# Patient Record
Sex: MALE | Race: WHITE | NOT HISPANIC OR LATINO
[De-identification: names, ages, dates, MRNs, and addresses within clinical notes are randomized per-mention and may not be internally consistent; named-entity substitution may affect disease eponyms.]

---

## 2019-09-20 PROBLEM — Z00.00 ENCOUNTER FOR PREVENTIVE HEALTH EXAMINATION: Status: ACTIVE | Noted: 2019-09-20

## 2019-09-23 ENCOUNTER — FORM ENCOUNTER (OUTPATIENT)
Age: 69
End: 2019-09-23

## 2019-09-24 ENCOUNTER — APPOINTMENT (OUTPATIENT)
Dept: ORTHOPEDIC SURGERY | Facility: CLINIC | Age: 69
End: 2019-09-24
Payer: MEDICARE

## 2019-09-24 ENCOUNTER — OUTPATIENT (OUTPATIENT)
Dept: OUTPATIENT SERVICES | Facility: HOSPITAL | Age: 69
LOS: 1 days | End: 2019-09-24
Payer: MEDICARE

## 2019-09-24 VITALS — BODY MASS INDEX: 31.07 KG/M2 | WEIGHT: 217 LBS | HEIGHT: 70 IN

## 2019-09-24 DIAGNOSIS — Z78.9 OTHER SPECIFIED HEALTH STATUS: ICD-10-CM

## 2019-09-24 DIAGNOSIS — Z60.2 PROBLEMS RELATED TO LIVING ALONE: ICD-10-CM

## 2019-09-24 DIAGNOSIS — S83.242A OTHER TEAR OF MEDIAL MENISCUS, CURRENT INJURY, LEFT KNEE, INITIAL ENCOUNTER: ICD-10-CM

## 2019-09-24 PROCEDURE — 72170 X-RAY EXAM OF PELVIS: CPT

## 2019-09-24 PROCEDURE — 73564 X-RAY EXAM KNEE 4 OR MORE: CPT

## 2019-09-24 PROCEDURE — 73564 X-RAY EXAM KNEE 4 OR MORE: CPT | Mod: 26,50

## 2019-09-24 PROCEDURE — 99203 OFFICE O/P NEW LOW 30 MIN: CPT

## 2019-09-24 PROCEDURE — 72170 X-RAY EXAM OF PELVIS: CPT | Mod: 26

## 2019-09-24 RX ORDER — MELOXICAM 15 MG/1
15 TABLET ORAL
Qty: 30 | Refills: 0 | Status: ACTIVE | COMMUNITY
Start: 2019-09-24 | End: 1900-01-01

## 2019-09-24 SDOH — SOCIAL STABILITY - SOCIAL INSECURITY: PROBLEMS RELATED TO LIVING ALONE: Z60.2

## 2019-10-13 PROBLEM — Z78.9 NON-SMOKER: Status: ACTIVE | Noted: 2019-10-13

## 2019-10-13 PROBLEM — Z60.2 LIVES ALONE: Status: ACTIVE | Noted: 2019-10-13

## 2019-10-13 NOTE — END OF VISIT
[FreeTextEntry3] : All medical record entries made by Gustavo Mayorga acting as a scribe for the performing provider (Luciano Peralta MD and/or PROSPER Biggs) on 09/24/2019. All entries were dictated to me by the performing medical provider. In signing this record, the medical provider affirms that they have personally performed the history, physical exam, assessment and plan and have also directed, reviewed and agreed to the documentation in the chart.

## 2019-10-13 NOTE — HISTORY OF PRESENT ILLNESS
[___ mths] : [unfilled] month(s) ago [Standing] : standing [Constant] : ~He/She~ states the symptoms seem to be constant [Bending] : worsened by bending [Sitting] : worsened by sitting [Walking] : worsened by walking [None] : No relieving factors are noted [de-identified] : 69 year old M presents today for initial evaluation of left knee pain that began 6 months ago. He reports moderate, occasional left knee pain with walking, biking and stairs as well as mild left knee stiffness. Patient can walk less than 5 blocks with a slight limp and uses a rail to ascend and descend stairs. Ibuprofen helps a little to relieve pain.\par Of note, patient is s/p partial meniscectomy on the right knee about 20 years ago and reports that he is satisfied with this surgery.\par \par

## 2019-10-13 NOTE — PHYSICAL EXAM
[de-identified] : Constitutional: Well appearing. No acute distress.\par Mental Status: Alert & oriented to person, place and time. Normal affect.\par Pulmonary: No respiratory distress. Normal chest excursion.\par \par Gait: Normal.\par Ambulatory assist devices: None.\par \par Cervical spine: Skin intact. No visible deformity. Painless active ROM without evident restriction.\par Bilateral upper extremities: Skin intact. No deformity. Painless active ROM without evident restriction.\par Thoracolumbar spine: No deformity. No tenderness. No radicular pain on passive straight leg raise bilaterally.\par \par Pelvis: No pelvic obliquity. No tenderness.\par Leg lengths: Equal.\par \par Bilateral Hips: No swelling or deformity. Painless and unrestricted range of motion. No crepitation. \par \par Right Knee:\par Skin intact. No surgical scars. No erythema or ecchymosis. No swelling or effusion. No deformity. No focal tenderness.\par Painless ROM from full extension to 135 degrees of flexion.\par Central patellar tracking. No crepitation. No instability.\par \par Left Knee:\par Skin intact. No surgical scars. No erythema or ecchymosis. No swelling or effusion. No deformity. No focal tenderness.\par Painful ROM from full extension to 120 degrees of flexion. (+) Medial knee pain on terminal knee flexion.\par Central patellar tracking. No crepitation. No instability.\par \par Neurological: Intact distal crude touch sensation. Normal distal motor power. \par Cardiovascular: Palpable dorsalis pedis and posterior tibialis pulses. Brisk capillary refill. No peripheral edema.\par Lymphatics: No peripheral adenopathy appreciated. [de-identified] : X-ray imaging of the left knee done here today demonstrates mild left knee arthritis.\par \par MRI imaging of the left knee done on 8/16/19:\par Impression:\par -Tear of the body and posterior horn of the medial meniscus with a moderate joint effusion and fluid in the popliteus tendon sheath. Minimal subchondral marrow edema involving the medial tibial plateau. No tendon or ligamentous tear.

## 2019-10-13 NOTE — DISCUSSION/SUMMARY
[de-identified] : Mr. Whyte presents with left knee pain secondary to meniscal tearing. I informed patient that he has mild DJD in the left knee and because of this, I do not currently recommend an arthroscopic operation at this time because I cannot guarantee that it would make his symptoms markedly better for a long period of time. I told patient that he may need knee replacement surgery in the future but noted that he has more arthritis in his right knee than in his left. \par We discussed the diagnosis and prognosis of the patient's condition. Non surgical treatment options include physical therapy and low impact exercise, weight loss for those who are overweight, NSAID and analgesic use, joint injections and activity modification including the use of assistive devices. I advised patient to find low impact exercise that helps him regain strength and coordination without aggravating the knee. I prescribed Meloxicam but advised patient not to take it concurrently with other antiinflammatories. I informed patient that if his pain doesn't improve within the next few months, we can discuss the possibility of an arthroscopic operation.\par Follow up if symptoms don't subside in the next few months, otherwise, follow up PRN.

## 2021-10-02 ENCOUNTER — INPATIENT (INPATIENT)
Facility: HOSPITAL | Age: 71
LOS: 1 days | Discharge: HOME | DRG: 312 | End: 2021-10-04
Attending: STUDENT IN AN ORGANIZED HEALTH CARE EDUCATION/TRAINING PROGRAM | Admitting: STUDENT IN AN ORGANIZED HEALTH CARE EDUCATION/TRAINING PROGRAM
Payer: MEDICARE

## 2021-10-02 VITALS
SYSTOLIC BLOOD PRESSURE: 143 MMHG | TEMPERATURE: 98 F | WEIGHT: 219.58 LBS | OXYGEN SATURATION: 96 % | DIASTOLIC BLOOD PRESSURE: 91 MMHG | RESPIRATION RATE: 17 BRPM | HEART RATE: 73 BPM

## 2021-10-02 DIAGNOSIS — N17.9 ACUTE KIDNEY FAILURE, UNSPECIFIED: ICD-10-CM

## 2021-10-02 DIAGNOSIS — Z87.798 PERSONAL HISTORY OF OTHER (CORRECTED) CONGENITAL MALFORMATIONS: Chronic | ICD-10-CM

## 2021-10-02 DIAGNOSIS — R42 DIZZINESS AND GIDDINESS: ICD-10-CM

## 2021-10-02 DIAGNOSIS — R94.31 ABNORMAL ELECTROCARDIOGRAM [ECG] [EKG]: ICD-10-CM

## 2021-10-02 DIAGNOSIS — Z29.9 ENCOUNTER FOR PROPHYLACTIC MEASURES, UNSPECIFIED: ICD-10-CM

## 2021-10-02 LAB
ALBUMIN SERPL ELPH-MCNC: 4.1 G/DL — SIGNIFICANT CHANGE UP (ref 3.3–5)
ALP SERPL-CCNC: 106 U/L — SIGNIFICANT CHANGE UP (ref 40–120)
ALT FLD-CCNC: 39 U/L — SIGNIFICANT CHANGE UP (ref 10–45)
ANION GAP SERPL CALC-SCNC: 10 MMOL/L — SIGNIFICANT CHANGE UP (ref 5–17)
APTT BLD: 33.3 SEC — SIGNIFICANT CHANGE UP (ref 27.5–35.5)
AST SERPL-CCNC: 33 U/L — SIGNIFICANT CHANGE UP (ref 10–40)
BASOPHILS # BLD AUTO: 0.06 K/UL — SIGNIFICANT CHANGE UP (ref 0–0.2)
BASOPHILS NFR BLD AUTO: 0.9 % — SIGNIFICANT CHANGE UP (ref 0–2)
BILIRUB SERPL-MCNC: 0.8 MG/DL — SIGNIFICANT CHANGE UP (ref 0.2–1.2)
BUN SERPL-MCNC: 24 MG/DL — HIGH (ref 7–23)
CALCIUM SERPL-MCNC: 9.8 MG/DL — SIGNIFICANT CHANGE UP (ref 8.4–10.5)
CHLORIDE SERPL-SCNC: 105 MMOL/L — SIGNIFICANT CHANGE UP (ref 96–108)
CO2 SERPL-SCNC: 29 MMOL/L — SIGNIFICANT CHANGE UP (ref 22–31)
CREAT SERPL-MCNC: 1.26 MG/DL — SIGNIFICANT CHANGE UP (ref 0.5–1.3)
EOSINOPHIL # BLD AUTO: 0.2 K/UL — SIGNIFICANT CHANGE UP (ref 0–0.5)
EOSINOPHIL NFR BLD AUTO: 3.1 % — SIGNIFICANT CHANGE UP (ref 0–6)
GLUCOSE SERPL-MCNC: 125 MG/DL — HIGH (ref 70–99)
HCT VFR BLD CALC: 46.9 % — SIGNIFICANT CHANGE UP (ref 39–50)
HGB BLD-MCNC: 16.4 G/DL — SIGNIFICANT CHANGE UP (ref 13–17)
IMM GRANULOCYTES NFR BLD AUTO: 0.3 % — SIGNIFICANT CHANGE UP (ref 0–1.5)
INR BLD: 0.98 RATIO — SIGNIFICANT CHANGE UP (ref 0.88–1.16)
LYMPHOCYTES # BLD AUTO: 1.69 K/UL — SIGNIFICANT CHANGE UP (ref 1–3.3)
LYMPHOCYTES # BLD AUTO: 25.8 % — SIGNIFICANT CHANGE UP (ref 13–44)
MCHC RBC-ENTMCNC: 30.4 PG — SIGNIFICANT CHANGE UP (ref 27–34)
MCHC RBC-ENTMCNC: 35 GM/DL — SIGNIFICANT CHANGE UP (ref 32–36)
MCV RBC AUTO: 86.9 FL — SIGNIFICANT CHANGE UP (ref 80–100)
MONOCYTES # BLD AUTO: 0.47 K/UL — SIGNIFICANT CHANGE UP (ref 0–0.9)
MONOCYTES NFR BLD AUTO: 7.2 % — SIGNIFICANT CHANGE UP (ref 2–14)
NEUTROPHILS # BLD AUTO: 4.11 K/UL — SIGNIFICANT CHANGE UP (ref 1.8–7.4)
NEUTROPHILS NFR BLD AUTO: 62.7 % — SIGNIFICANT CHANGE UP (ref 43–77)
NRBC # BLD: 0 /100 WBCS — SIGNIFICANT CHANGE UP (ref 0–0)
PLATELET # BLD AUTO: 226 K/UL — SIGNIFICANT CHANGE UP (ref 150–400)
POTASSIUM SERPL-MCNC: 4.4 MMOL/L — SIGNIFICANT CHANGE UP (ref 3.5–5.3)
POTASSIUM SERPL-SCNC: 4.4 MMOL/L — SIGNIFICANT CHANGE UP (ref 3.5–5.3)
PROT SERPL-MCNC: 7.8 G/DL — SIGNIFICANT CHANGE UP (ref 6–8.3)
PROTHROM AB SERPL-ACNC: 11.9 SEC — SIGNIFICANT CHANGE UP (ref 10.6–13.6)
RBC # BLD: 5.4 M/UL — SIGNIFICANT CHANGE UP (ref 4.2–5.8)
RBC # FLD: 12 % — SIGNIFICANT CHANGE UP (ref 10.3–14.5)
SARS-COV-2 RNA SPEC QL NAA+PROBE: SIGNIFICANT CHANGE UP
SODIUM SERPL-SCNC: 144 MMOL/L — SIGNIFICANT CHANGE UP (ref 135–145)
TROPONIN I SERPL-MCNC: <.017 NG/ML — LOW (ref 0.02–0.06)
TROPONIN I SERPL-MCNC: <.017 NG/ML — LOW (ref 0.02–0.06)
WBC # BLD: 6.55 K/UL — SIGNIFICANT CHANGE UP (ref 3.8–10.5)
WBC # FLD AUTO: 6.55 K/UL — SIGNIFICANT CHANGE UP (ref 3.8–10.5)

## 2021-10-02 PROCEDURE — 70498 CT ANGIOGRAPHY NECK: CPT | Mod: 26,MA

## 2021-10-02 PROCEDURE — 0042T: CPT

## 2021-10-02 PROCEDURE — 70496 CT ANGIOGRAPHY HEAD: CPT | Mod: 26,MA

## 2021-10-02 PROCEDURE — 70450 CT HEAD/BRAIN W/O DYE: CPT | Mod: 26,59,MA

## 2021-10-02 PROCEDURE — 93010 ELECTROCARDIOGRAM REPORT: CPT

## 2021-10-02 PROCEDURE — 99222 1ST HOSP IP/OBS MODERATE 55: CPT

## 2021-10-02 PROCEDURE — 99285 EMERGENCY DEPT VISIT HI MDM: CPT | Mod: GC

## 2021-10-02 RX ORDER — ONDANSETRON 8 MG/1
4 TABLET, FILM COATED ORAL EVERY 6 HOURS
Refills: 0 | Status: DISCONTINUED | OUTPATIENT
Start: 2021-10-02 | End: 2021-10-04

## 2021-10-02 RX ORDER — MECLIZINE HCL 12.5 MG
25 TABLET ORAL
Refills: 0 | Status: COMPLETED | OUTPATIENT
Start: 2021-10-02 | End: 2021-10-04

## 2021-10-02 RX ORDER — ASPIRIN/CALCIUM CARB/MAGNESIUM 324 MG
324 TABLET ORAL ONCE
Refills: 0 | Status: COMPLETED | OUTPATIENT
Start: 2021-10-02 | End: 2021-10-02

## 2021-10-02 RX ORDER — SODIUM CHLORIDE 9 MG/ML
1000 INJECTION INTRAMUSCULAR; INTRAVENOUS; SUBCUTANEOUS
Refills: 0 | Status: DISCONTINUED | OUTPATIENT
Start: 2021-10-02 | End: 2021-10-03

## 2021-10-02 RX ORDER — MECLIZINE HCL 12.5 MG
25 TABLET ORAL ONCE
Refills: 0 | Status: COMPLETED | OUTPATIENT
Start: 2021-10-02 | End: 2021-10-02

## 2021-10-02 RX ORDER — ONDANSETRON 8 MG/1
4 TABLET, FILM COATED ORAL ONCE
Refills: 0 | Status: COMPLETED | OUTPATIENT
Start: 2021-10-02 | End: 2021-10-02

## 2021-10-02 RX ORDER — ACETAMINOPHEN 500 MG
650 TABLET ORAL EVERY 6 HOURS
Refills: 0 | Status: DISCONTINUED | OUTPATIENT
Start: 2021-10-02 | End: 2021-10-04

## 2021-10-02 RX ORDER — MECLIZINE HCL 12.5 MG
25 TABLET ORAL THREE TIMES A DAY
Refills: 0 | Status: DISCONTINUED | OUTPATIENT
Start: 2021-10-02 | End: 2021-10-04

## 2021-10-02 RX ORDER — SODIUM CHLORIDE 9 MG/ML
1000 INJECTION INTRAMUSCULAR; INTRAVENOUS; SUBCUTANEOUS ONCE
Refills: 0 | Status: COMPLETED | OUTPATIENT
Start: 2021-10-02 | End: 2021-10-02

## 2021-10-02 RX ADMIN — SODIUM CHLORIDE 1000 MILLILITER(S): 9 INJECTION INTRAMUSCULAR; INTRAVENOUS; SUBCUTANEOUS at 13:35

## 2021-10-02 RX ADMIN — Medication 25 MILLIGRAM(S): at 13:43

## 2021-10-02 RX ADMIN — ONDANSETRON 4 MILLIGRAM(S): 8 TABLET, FILM COATED ORAL at 15:19

## 2021-10-02 RX ADMIN — Medication 324 MILLIGRAM(S): at 14:22

## 2021-10-02 NOTE — H&P ADULT - PROBLEM SELECTOR PLAN 3
- Reports that he doesn't drink water   - eGFR 58  - Baseline SCr unknown  - Encourage oral intake  - IVFs x 12 hrs then D/C  - Monitor BMP

## 2021-10-02 NOTE — H&P ADULT - NSHPSOCIALHISTORY_GEN_ALL_CORE
Former smoker of cigarettes, drinks alcohol (beer) on occasion. Smokes marijuana at times. Denies illicit drug use

## 2021-10-02 NOTE — ED PROVIDER NOTE - ATTENDING CONTRIBUTION TO CARE
Dr. Cole: I have personally performed a face to face bedside history and physical examination of this patient. I have discussed the history, examination, review of systems, assessment and plan of management with the resident. I have reviewed the electronic medical record and amended it to reflect my history, review of systems, physical exam, assessment and plan.    see mdm

## 2021-10-02 NOTE — ED ADULT NURSE NOTE - NSIMPLEMENTINTERV_GEN_ALL_ED
Implemented All Universal Safety Interventions:  [place] to call system. Call bell, personal items and telephone within reach. Instruct patient to call for assistance. Room bathroom lighting operational. Non-slip footwear when patient is off stretcher. Physically safe environment: no spills, clutter or unnecessary equipment. Stretcher in lowest position, wheels locked, appropriate side rails in place.

## 2021-10-02 NOTE — H&P ADULT - NSHPPHYSICALEXAM_GEN_ALL_CORE
T(C): 36.4 (10-02-21 @ 13:07), Max: 36.4 (10-02-21 @ 13:07)  HR: 79 (10-02-21 @ 13:57) (73 - 79)  BP: 130/90 (10-02-21 @ 13:57) (130/90 - 143/91)  RR: 15 (10-02-21 @ 13:57) (15 - 17)  SpO2: 100% (10-02-21 @ 13:57) (96% - 100%)  Wt(kg): --Vital Signs Last 24 Hrs  T(C): 36.4 (02 Oct 2021 13:07), Max: 36.4 (02 Oct 2021 13:07)  T(F): 97.5 (02 Oct 2021 13:07), Max: 97.5 (02 Oct 2021 13:07)  HR: 79 (02 Oct 2021 13:57) (73 - 79)  BP: 130/90 (02 Oct 2021 13:57) (130/90 - 143/91)  BP(mean): --  RR: 15 (02 Oct 2021 13:57) (15 - 17)  SpO2: 100% (02 Oct 2021 13:57) (96% - 100%)    PHYSICAL EXAM:  GENERAL: NAD  HENT:  Atraumatic, Normocephalic; No tonsillar erythema, exudates, or enlargement; Moist mucous membranes;   EYES: EOMI, PERRLA, conjunctiva and sclera clear, no lid-lag  NECK: Supple, No JVD, Normal thyroid  NERVOUS SYSTEM:  CN II - XII intact; Sensation intact; Motor Strength 5/5 B/L upper and lower extremities; No nystagmus on exam  CHEST/LUNG: Clear to percussion bilaterally; No rales, rhonchi, wheezing, or rubs; normal respiratory effort, no intercostal retractions  HEART: Regular rate and rhythm; No murmurs, rubs, or gallops  ABDOMEN: Soft, Nontender, Nondistended; Bowel sounds present; No HSM  MUSCULOSKELETAL/EXTREMITIES:  2+ Peripheral Pulses, No clubbing, cyanosis, or peripheral edema; No digital cyanosis  SKIN: No rashes or lesions; normal texture and temperature  PSYCH: Appropriate affect, Alert & Oriented x 3

## 2021-10-02 NOTE — H&P ADULT - HISTORY OF PRESENT ILLNESS
70 yo male with no past medical history comes to ED after episode of dizziness that occurred while sitting at the table having breakfast. Patient had 1 other mild episode Wed that passed on its own. Today he had this sudden feeling of not feeling right and reports the room spinning. This occurs intermittently, with change in vision different from his baseline, and he has some nausea. He denies any change in eating habits. There was no vomiting, chest pan, abdominal pain, or shortness of breath. He denies any sick contacts, ringing of his ears, change in bowel or urinary habits.

## 2021-10-02 NOTE — ED PROVIDER NOTE - CLINICAL SUMMARY MEDICAL DECISION MAKING FREE TEXT BOX
Dr. Cole: 71M no PMHx p/w unsteady gait x 11am, now slightly improved, +nausea. No vomiting. No chest pain or sob, no weakness or numbness in arms or legs, no slurred speech. On exam pt is well appearing, nad, rrr, +left sided nystagmus, abdo soft/nt/nd, no ataxia. Likely BPPV, however given abnormal ekg will admit for work up.

## 2021-10-02 NOTE — ED PROVIDER NOTE - CARE PLAN
Principal Discharge DX:	Dizziness   1 Principal Discharge DX:	Dizziness  Secondary Diagnosis:	Abnormal EKG

## 2021-10-02 NOTE — H&P ADULT - NSHPREVIEWOFSYSTEMS_GEN_ALL_CORE
CONSTITUTIONAL: No fever, weight loss, or fatigue  EYES: + visual disturbances; No eye pain or discharge  ENMT:  + Dizziness; No difficulty hearing, tinnitus; No sinus or throat pain  NECK: No pain or stiffness  RESPIRATORY: No cough, wheezing, chills or hemoptysis; No shortness of breath  CARDIOVASCULAR: No chest pain, palpitations, dizziness, or leg swelling  GASTROINTESTINAL: No abdominal or epigastric pain. No nausea, vomiting, or hematemesis; No diarrhea or constipation. No melena or hematochezia.  GENITOURINARY: No dysuria, frequency, hematuria, or incontinence  NEUROLOGICAL: No headaches, memory loss, loss of strength, numbness, or tremors  SKIN: No itching, burning, rashes, or lesions   MUSCULOSKELETAL: No joint pain or swelling; No muscle, back, or extremity pain  PSYCHIATRIC: No depression, anxiety, mood swings, or difficulty sleeping  ALLERGY AND IMMUNOLOGIC: No hives or eczema    ALL ROS REVIEWED AND NORMAL EXCEPT AS STATED ABOVE

## 2021-10-02 NOTE — ED PROVIDER NOTE - OBJECTIVE STATEMENT
- Patient seen and examined  - Proceed with Diagnostic LHC and RHC   - Access R Radial and R Brachial (RIJ back up)  - Allergies: none  -The risks, benefits & alternatives of the procedure were explained to the patient.    -The risks of coronary angiography include but are not limited to:  Bleeding, infection, heart rhythm abnormalities, allergic reactions, kidney injury, stroke and death.    -Should stenting be indicated, the patient has agreed to dual anti-platelet therapy for 1-consecutive year with a drug-eluting stent and a minimum of 1-month with the use of a bare metal stent.    -The risks of moderate sedation include hypotension, respiratory depression, arrhythmias, bronchospasm, & death.    -Informed consent was obtained & the patient is agreeable to proceed with the procedure.      71M w/ no PMHx p/w dizziness that started at 11am.  Pt. was sitting at home when sxs started.  Room-spinning sensation, intermittent in nature a/w nausea.  No vomiting, tinnitus, f/c, sick contacts, CP, SOB.  Sxs improved after arriving at the hospital.  Has a PMD for the last two years, but did not see a physician for ~20 years prior to that.  Has never had a stress test.  Denies hx of MI, CVA, cardiac stents.  Drank a glass of [employer] last night.  Unvaccinated for covid.

## 2021-10-02 NOTE — H&P ADULT - NSHPLABSRESULTS_GEN_ALL_CORE
LABS:                        16.4   6.55  )-----------( 226      ( 02 Oct 2021 13:10 )             46.9     10-02    144  |  105  |  24<H>  ----------------------------<  125<H>  4.4   |  29  |  1.26    Ca    9.8      02 Oct 2021 13:10    TPro  7.8  /  Alb  4.1  /  TBili  0.8  /  DBili  x   /  AST  33  /  ALT  39  /  AlkPhos  106  10-02    PT/INR - ( 02 Oct 2021 13:10 )   PT: 11.9 sec;   INR: 0.98 ratio         PTT - ( 02 Oct 2021 13:10 )  PTT:33.3 sec    CAPILLARY BLOOD GLUCOSE    RADIOLOGY & ADDITIONAL TESTS:  CXR- pending    CT Angio Neck w/ IV Cont (10.02.21)  CTA brain: No hemodynamically significant stenosis  CTA carotid/vertebral artery circulation: No hemodynamically significant stenosis  CT Perfusion: Normal    EKG (10/2) - NSR, TWI, aVL, V2 - V6, qTC 450    Care Discussed with Consultants/Other Providers [ x] YES  [ ] NO  Imaging Personally Reviewed:  [X ] YES  [ ] NO

## 2021-10-02 NOTE — H&P ADULT - PROBLEM SELECTOR PLAN 2
- TWI noted on initial EKG with No EKG to compare  - Follow up TTE  - Repeat EKG in AM  - Trend troponin

## 2021-10-02 NOTE — H&P ADULT - PROBLEM SELECTOR PLAN 4
Encourage to ambulate    Patient is not vaccinated for COVID-19 Encourage to ambulate    Full Code    Patient is not vaccinated for COVID-19

## 2021-10-02 NOTE — H&P ADULT - PROBLEM SELECTOR PLAN 1
- Possibly due to Vertigo vs Dehydration  - CT head: No acute ICH or infarct.  - CTA Brain/Neck: Neg for significant stenosis  - Continue telemetry monitoring to assess for arrythmia   - Neurology consulted. Follow up recommendations  - Meclizine 25mg po BID x 2 days.  - Continue Meclizine TID PRN  - Zofran 4mg IVP Q6hrs PRN for nausea/vomiting.  - IVF NS@ 100 ml/hr for 12 hrs, then re-evaluate  - F/U A1c, B12, TSH  - Difficulty ambulating but getting better - Possibly due to Vertigo vs Dehydration  - CT head: No acute ICH or infarct.  - CTA Brain/Neck: Neg for significant stenosis  - Continue telemetry monitoring to assess for arrythmia   - Neurology consulted. Recommends repeat CT head in 24 hrs, if neg. Can D/C  - Meclizine 25mg po BID x 2 days.  - Continue Meclizine TID PRN  - Zofran 4mg IVP Q6hrs PRN for nausea/vomiting.  - IVF NS@ 100 ml/hr for 12 hrs, then re-evaluate  - F/U A1c, B12, TSH  - Difficulty ambulating but getting better

## 2021-10-02 NOTE — ED ADULT NURSE NOTE - OBJECTIVE STATEMENT
Arrived with complaints of dizziness and nausea, no deficits or weakness noted, BBS lungs clear, skin warm dry.

## 2021-10-02 NOTE — H&P ADULT - ASSESSMENT
70 yo male admitted to [facility] after episodes of dizziness R/O CVA.    Spoke with Wife and daughter Joy (works here) at bedside    Joy [phone]    IMPROVE VTE Individual Risk Assessment    RISK                                                                Points  [  ] Previous VTE                                                  3  [  ] Thrombophilia                                               2  [  ] Lower limb paralysis                                      2       (unable to hold up >15 seconds)    [  ] Current Cancer                                              2         (within 6 months)  [  ] Immobilization > 24 hrs                                1  [  ] ICU/CCU stay > 24 hours                              1  [1  ] Age > 60                                                      1    IMPROVE Score 1: Low Risk, No VTE prophylaxis required for most patients, encourage ambulation.    70 yo male admitted to [facility] after episodes of dizziness R/O arrythmia    Spoke with Wife and daughter Joy (works here) at bedside    Joy [phone]    IMPROVE VTE Individual Risk Assessment    RISK                                                                Points  [  ] Previous VTE                                                  3  [  ] Thrombophilia                                               2  [  ] Lower limb paralysis                                      2       (unable to hold up >15 seconds)    [  ] Current Cancer                                              2         (within 6 months)  [  ] Immobilization > 24 hrs                                1  [  ] ICU/CCU stay > 24 hours                              1  [1  ] Age > 60                                                      1    IMPROVE Score 1: Low Risk, No VTE prophylaxis required for most patients, encourage ambulation.

## 2021-10-03 ENCOUNTER — TRANSCRIPTION ENCOUNTER (OUTPATIENT)
Age: 71
End: 2021-10-03

## 2021-10-03 LAB
A1C WITH ESTIMATED AVERAGE GLUCOSE RESULT: 6.1 % — HIGH (ref 4–5.6)
ALBUMIN SERPL ELPH-MCNC: 3.4 G/DL — SIGNIFICANT CHANGE UP (ref 3.3–5)
ALP SERPL-CCNC: 91 U/L — SIGNIFICANT CHANGE UP (ref 40–120)
ALT FLD-CCNC: 31 U/L — SIGNIFICANT CHANGE UP (ref 10–45)
ANION GAP SERPL CALC-SCNC: 7 MMOL/L — SIGNIFICANT CHANGE UP (ref 5–17)
AST SERPL-CCNC: 24 U/L — SIGNIFICANT CHANGE UP (ref 10–40)
BILIRUB SERPL-MCNC: 0.8 MG/DL — SIGNIFICANT CHANGE UP (ref 0.2–1.2)
BUN SERPL-MCNC: 18 MG/DL — SIGNIFICANT CHANGE UP (ref 7–23)
CALCIUM SERPL-MCNC: 9.1 MG/DL — SIGNIFICANT CHANGE UP (ref 8.4–10.5)
CHLORIDE SERPL-SCNC: 108 MMOL/L — SIGNIFICANT CHANGE UP (ref 96–108)
CHOLEST SERPL-MCNC: 177 MG/DL — SIGNIFICANT CHANGE UP
CO2 SERPL-SCNC: 29 MMOL/L — SIGNIFICANT CHANGE UP (ref 22–31)
COVID-19 SPIKE DOMAIN AB INTERP: NEGATIVE — SIGNIFICANT CHANGE UP
COVID-19 SPIKE DOMAIN ANTIBODY RESULT: 0.4 U/ML — SIGNIFICANT CHANGE UP
CREAT SERPL-MCNC: 1.47 MG/DL — HIGH (ref 0.5–1.3)
ESTIMATED AVERAGE GLUCOSE: 128 MG/DL — HIGH (ref 68–114)
GLUCOSE SERPL-MCNC: 96 MG/DL — SIGNIFICANT CHANGE UP (ref 70–99)
HCT VFR BLD CALC: 43.2 % — SIGNIFICANT CHANGE UP (ref 39–50)
HCV AB S/CO SERPL IA: 0.08 S/CO — SIGNIFICANT CHANGE UP (ref 0–0.99)
HCV AB SERPL-IMP: SIGNIFICANT CHANGE UP
HDLC SERPL-MCNC: 37 MG/DL — LOW
HGB BLD-MCNC: 14.9 G/DL — SIGNIFICANT CHANGE UP (ref 13–17)
LIPID PNL WITH DIRECT LDL SERPL: 117 MG/DL — HIGH
MAGNESIUM SERPL-MCNC: 1.8 MG/DL — SIGNIFICANT CHANGE UP (ref 1.6–2.6)
MCHC RBC-ENTMCNC: 30.1 PG — SIGNIFICANT CHANGE UP (ref 27–34)
MCHC RBC-ENTMCNC: 34.5 GM/DL — SIGNIFICANT CHANGE UP (ref 32–36)
MCV RBC AUTO: 87.3 FL — SIGNIFICANT CHANGE UP (ref 80–100)
NON HDL CHOLESTEROL: 141 MG/DL — HIGH
NRBC # BLD: 0 /100 WBCS — SIGNIFICANT CHANGE UP (ref 0–0)
PHOSPHATE SERPL-MCNC: 2.9 MG/DL — SIGNIFICANT CHANGE UP (ref 2.5–4.5)
PLATELET # BLD AUTO: 216 K/UL — SIGNIFICANT CHANGE UP (ref 150–400)
POTASSIUM SERPL-MCNC: 4.3 MMOL/L — SIGNIFICANT CHANGE UP (ref 3.5–5.3)
POTASSIUM SERPL-SCNC: 4.3 MMOL/L — SIGNIFICANT CHANGE UP (ref 3.5–5.3)
PROT SERPL-MCNC: 6.4 G/DL — SIGNIFICANT CHANGE UP (ref 6–8.3)
RBC # BLD: 4.95 M/UL — SIGNIFICANT CHANGE UP (ref 4.2–5.8)
RBC # FLD: 12 % — SIGNIFICANT CHANGE UP (ref 10.3–14.5)
SARS-COV-2 IGG+IGM SERPL QL IA: 0.4 U/ML — SIGNIFICANT CHANGE UP
SARS-COV-2 IGG+IGM SERPL QL IA: NEGATIVE — SIGNIFICANT CHANGE UP
SODIUM SERPL-SCNC: 144 MMOL/L — SIGNIFICANT CHANGE UP (ref 135–145)
TRIGL SERPL-MCNC: 116 MG/DL — SIGNIFICANT CHANGE UP
TROPONIN I SERPL-MCNC: <.017 NG/ML — LOW (ref 0.02–0.06)
TSH SERPL-MCNC: 3.16 UIU/ML — SIGNIFICANT CHANGE UP (ref 0.36–3.74)
VIT B12 SERPL-MCNC: 513 PG/ML — SIGNIFICANT CHANGE UP (ref 232–1245)
WBC # BLD: 7.51 K/UL — SIGNIFICANT CHANGE UP (ref 3.8–10.5)
WBC # FLD AUTO: 7.51 K/UL — SIGNIFICANT CHANGE UP (ref 3.8–10.5)

## 2021-10-03 PROCEDURE — 99222 1ST HOSP IP/OBS MODERATE 55: CPT

## 2021-10-03 PROCEDURE — 70450 CT HEAD/BRAIN W/O DYE: CPT | Mod: 26

## 2021-10-03 PROCEDURE — 99233 SBSQ HOSP IP/OBS HIGH 50: CPT

## 2021-10-03 PROCEDURE — 93306 TTE W/DOPPLER COMPLETE: CPT | Mod: 26

## 2021-10-03 PROCEDURE — 93010 ELECTROCARDIOGRAM REPORT: CPT

## 2021-10-03 PROCEDURE — 93010 ELECTROCARDIOGRAM REPORT: CPT | Mod: 76

## 2021-10-03 RX ORDER — ASPIRIN/CALCIUM CARB/MAGNESIUM 324 MG
1 TABLET ORAL
Qty: 0 | Refills: 0 | DISCHARGE
Start: 2021-10-03

## 2021-10-03 RX ORDER — MECLIZINE HCL 12.5 MG
1 TABLET ORAL
Qty: 0 | Refills: 0 | DISCHARGE
Start: 2021-10-03

## 2021-10-03 RX ORDER — ASPIRIN/CALCIUM CARB/MAGNESIUM 324 MG
81 TABLET ORAL DAILY
Refills: 0 | Status: DISCONTINUED | OUTPATIENT
Start: 2021-10-04 | End: 2021-10-04

## 2021-10-03 RX ORDER — ASPIRIN/CALCIUM CARB/MAGNESIUM 324 MG
81 TABLET ORAL ONCE
Refills: 0 | Status: COMPLETED | OUTPATIENT
Start: 2021-10-03 | End: 2021-10-03

## 2021-10-03 RX ORDER — ATORVASTATIN CALCIUM 80 MG/1
1 TABLET, FILM COATED ORAL
Qty: 30 | Refills: 0
Start: 2021-10-03 | End: 2021-11-01

## 2021-10-03 RX ORDER — ASPIRIN/CALCIUM CARB/MAGNESIUM 324 MG
1 TABLET ORAL
Qty: 30 | Refills: 1
Start: 2021-10-03 | End: 2021-12-01

## 2021-10-03 RX ORDER — REGADENOSON 0.08 MG/ML
0.4 INJECTION, SOLUTION INTRAVENOUS ONCE
Refills: 0 | Status: COMPLETED | OUTPATIENT
Start: 2021-10-03 | End: 2021-10-04

## 2021-10-03 RX ORDER — MECLIZINE HCL 12.5 MG
1 TABLET ORAL
Qty: 14 | Refills: 0
Start: 2021-10-03 | End: 2021-10-09

## 2021-10-03 RX ORDER — ATORVASTATIN CALCIUM 80 MG/1
20 TABLET, FILM COATED ORAL AT BEDTIME
Refills: 0 | Status: DISCONTINUED | OUTPATIENT
Start: 2021-10-03 | End: 2021-10-04

## 2021-10-03 RX ADMIN — ATORVASTATIN CALCIUM 20 MILLIGRAM(S): 80 TABLET, FILM COATED ORAL at 21:33

## 2021-10-03 RX ADMIN — Medication 81 MILLIGRAM(S): at 13:46

## 2021-10-03 NOTE — CONSULT NOTE ADULT - NEUROLOGICAL DETAILS
alert and oriented x 3/responds to verbal commands/sensation intact/deep reflexes intact/cranial nerves intact/normal strength

## 2021-10-03 NOTE — DISCHARGE NOTE PROVIDER - CARE PROVIDER_API CALL
Abelardo Carey  INTERNAL MEDICINE  [address] [place] [id number]  Phone: [phone]  Fax: [phone]  Follow Up Time:     Kevin Wilkinson  CARDIOLOGY  [address], [address]  [place] [address]  Phone: [phone]  Fax: [phone]  Follow Up Time:

## 2021-10-03 NOTE — CONSULT NOTE ADULT - SUBJECTIVE AND OBJECTIVE BOX
CHIEF COMPLAINT:  Patient is a 71y old  Male who presents with a chief complaint of Dizziness (03 Oct 2021 12:21)    HPI:  70 yo male with no past medical history comes to ED after episode of dizziness that occurred while sitting at the table having breakfast. Patient had 1 other mild episode Wed that passed on its own. Today he had this sudden feeling of not feeling right and reports the room spinning. This occurs intermittently, with change in vision different from his baseline, and he has some nausea. He denies any change in eating habits. There was no vomiting, chest pan, abdominal pain, or shortness of breath. He denies any sick contacts, ringing of his ears, change in bowel or urinary habits.    (02 Oct 2021 15:05)    Seen for abnormal ekg. Patient interviewed examined, and chart reviewed. Daughter Kody present.  Dizziness improving. No CAD history nor HBP. Walksdaily without symptoms goes to gym regularly.          PMH:   Pre-diabetes        PSH:   H/O removal of thyroglossal duct cyst      FAMILY HISTORY:  FH: CAD (coronary artery disease) (Father)  s/p stents        SOCIAL HISTORY:  Smoking:  no  retired [employer]  Drugs:    ALLERGIES:  No Known Allergies      Home Medications:  aspirin 81 mg oral tablet, chewable: 1 tab(s) orally once (03 Oct 2021 12:49)  meclizine 25 mg oral tablet: 1 tab(s) orally 2 times a day (03 Oct 2021 12:49)      MEDICATIONS:  acetaminophen   Tablet .. 650 milliGRAM(s) Oral every 6 hours PRN  aspirin  chewable 81 milliGRAM(s) Oral once  atorvastatin 20 milliGRAM(s) Oral at bedtime  meclizine 25 milliGRAM(s) Oral two times a day  meclizine 25 milliGRAM(s) Oral three times a day PRN  ondansetron Injectable 4 milliGRAM(s) IV Push every 6 hours PRN  sodium chloride 0.9%. 1000 milliLiter(s) IV Continuous <Continuous>      REVIEW OF SYSTEMS:  CONSTITUTIONAL: No fever, weight loss, or fatigue  EYES: No eye pain, visual disturbances, or discharge  ENMT:  No difficulty hearing, tinnitus, vertigo; No sinus or throat pain  NECK: No pain or stiffness  BREASTS: No pain, masses, or nipple discharge  RESPIRATORY: No cough, wheezing, chills or hemoptysis; No shortness of breath  CARDIOVASCULAR: No chest pain, palpitations, dizziness, or leg swelling  GASTROINTESTINAL: No abdominal or epigastric pain. No nausea, vomiting, or hematemesis; No diarrhea or constipation. No melena or hematochezia.  GENITOURINARY: No dysuria, frequency, hematuria, or incontinence  NEUROLOGICAL: No headaches, memory loss, loss of strength, numbness, or tremors  SKIN: No itching, burning, rashes, or lesions   LYMPH NODES: No enlarged glands  ENDOCRINE: No heat or cold intolerance; No hair loss  MUSCULOSKELETAL: No joint pain or swelling; No muscle, back, or extremity pain  PSYCHIATRIC: No depression, anxiety, mood swings, or difficulty sleeping  HEME/LYMPH: No easy bruising, or bleeding gums  ALLERGY AND IMMUNOLOGIC: No hives or eczema    PHYSICAL EXAM:  T(C): 36.7 (10-03-21 @ 11:45), Max: 36.8 (10-02-21 @ 20:51)  HR: 75 (10-03-21 @ 11:45) (61 - 79)  BP: 121/85 (10-03-21 @ 11:45) (113/70 - 148/92)  RR: 16 (10-03-21 @ 11:45) (10 - 17)  SpO2: 93% (10-03-21 @ 11:45) (93% - 100%)  Wt(kg): --    GENERAL: NAD, well-groomed, well-developed  HEAD:  Atraumatic, Normocephalic  EYES: EOMI, conjunctiva and sclera clear  ENT: Moist mucous membranes,  NECK: Supple, No JVD, no bruits  CHEST/LUNG: Clear to ausculation and percussion bilaterally; No rales, rhonchi, wheezing, or rubs  HEART: Regular rate and rhythm; No murmurs, rubs, or gallops PMI non displaced.  ABDOMEN: Soft, Nontender, Nondistended; Bowel sounds present  EXTREMITIES:  2+ Peripheral Pulses, No clubbing, cyanosis, or edema  SKIN: No rashes or lesions  NERVOUS SYSTEM:  Alert & Oriented X3, No focal deficits    Cardiovascular Diagnostic Testing:  ECG:   < from: 12 Lead ECG (10.03.21 @ 05:56) >  Ventricular Rate 63 BPM    Atrial Rate 63 BPM    P-R Interval 162 ms    QRS Duration 72 ms    Q-T Interval 422 ms    QTC Calculation(Bazett) 431 ms    P Axis 22 degrees    R Axis 60 degrees    T Axis 124 degrees    Diagnosis Line Normal sinus rhythm  ST and T wave abnormality, consider lateral ischemia  Abnormal ECG  When compared with ECG of 02-OCT-2021 13:31,  No significant change was found  Confirmed by VICENTA SOMMER, KEVIN FARFAN () on 10/3/2021 9:18:18 AM    < end of copied text >    LABS:                        14.9   7.51  )-----------( 216      ( 03 Oct 2021 05:00 )             43.2     10    144  |  108  |  18  ----------------------------<  96  4.3   |  29  |  1.47<H>    Ca    9.1      03 Oct 2021 05:00  Phos  2.9     10-03  Mg     1.8     10-03    TPro  6.4  /  Alb  3.4  /  TBili  0.8  /  DBili  x   /  AST  24  /  ALT  31  /  AlkPhos  91  10    PT/INR - ( 02 Oct 2021 13:10 )   PT: 11.9 sec;   INR: 0.98 ratio         PTT - ( 02 Oct 2021 13:10 )  PTT:33.3 sec  CARDIAC MARKERS ( 03 Oct 2021 05:00 )  <.017 ng/mL / x     / x     / x     / x      CARDIAC MARKERS ( 02 Oct 2021 19:21 )  <.017 ng/mL / x     / x     / x     / x      CARDIAC MARKERS ( 02 Oct 2021 13:10 )  <.017 ng/mL / x     / x     / x     / x            Total Cholesterol: 177  LDL: --  HDL: 37  T      Thyroid Stimulating Hormone, Serum: 3.157 uIU/mL (10-03 @ 05:00)      IMAGING:  < from: TTE Echo Complete w/o Contrast w/ Doppler (10.03.21 @ 10:11) >    EXAM:  ECHO TTE WO CON COMP W DOPP      PROCEDURE DATE:  10/03/2021        INTERPRETATION:  TRANSTHORACIC ECHOCARDIOGRAM REPORT        Patient Name:   ALICE ULLOA Patient Location: [address] Rec #:  [id number]       Accession #:      [id number]  Account #:      [id number]        Height:           70.0 in 177.8 cm  YOB: 1950      Weight:           216.0 lb 97.98 kg  Patient Age:    71 years       BSA:              2.16 m²  Patient Gender: M              BP:               133/74 mmHg      Date of Exam:        10/3/2021 10:11:05 AM  Sonographer:         GILBERTO  Referring Physician: BART    Procedure:     2D Echo/Doppler/Color Doppler Complete.  Indications:   DIZZINESS  Diagnosis:     Abnormal electrocardiogram [ECG] [EKG]- R94.31  Study Details: Technically fair study.        2D AND M-MODE MEASUREMENTS (normal ranges within parentheses):  Left Ventricle:                  Normal   Aorta/Left Atrium:             Normal  IVSd (2D):              1.17 cm (0.7-1.1) Aortic Root (Mmode): 3.80 cm (2.4-3.7)  LVPWd (2D):             1.20 cm (0.7-1.1) AoV Cusp Separation: 2.19 cm (1.5-2.6)  LVIDd (2D):             5.20 cm (3.4-5.7) Left Atrium (Mmode): 4.38 cm (1.9-4.0)  LVIDs (2D):             3.20 cm  LV FS (2D):  38.5 %   (>25%)  LV EF (2D):              68 %    (>55%)  Relative Wall Thickness  0.46    (<0.42)    LV DIASTOLIC FUNCTION:  MV Peak E: 0.57 m/s Decel Time: 244 msec  MV Peak A: 0.66 m/s  E/A Ratio: 0.87    SPECTRAL DOPPLER ANALYSIS (where applicable):  Mitral Valve:  MV P1/2 Time: 70.90 msec  MV Area, PHT: 3.10 cm²    Aortic Valve: AoV Max Charbel: 1.54 m/s AoV Peak P.5 mmHg AoV Mean P.2 mmHg    LVOT Vmax: 1.25 m/s LVOT VTI: 0.212 m LVOT Diameter: 2.00 cm    AoV Area, Vmax: 2.55 cm² AoV Area, VTI: 2.54 cm² AoV Area, Vmn: 2.68 cm²  Ao VTI: 0.262  Tricuspid Valve and PA/RV Systolic Pressure: TR Max Velocity: 1.60 m/s RA Pressure: 10 mmHg RVSP/PASP: 20.3 mmHg      PHYSICIAN INTERPRETATION:  Left Ventricle: The left ventricular internal cavity size is normal. Left ventricular wall thickness is mildly increased. There is mild concentric left ventricular hypertrophy involving the global wall. The LVH involves global walls.  Global LV systolic function was normal. Left ventricular ejection fraction, by visual estimation, is 50 to 55%. Normal segmental left ventricular systolic function. Spectral Doppler shows impaired relaxation pattern of left ventricular myocardial filling (Grade I diastolic dysfunction).  Right Ventricle: The right ventricular size is mildly enlarged.  Left Atrium: Mildly enlarged left atrium. LA volume Index is 25.7 ml/m² ml/m2.  Right Atrium: Mildly enlarged right atrium.  Pericardium: There is no evidence of pericardial effusion.  Mitral Valve: Thickening of the anterior and posterior mitral valve leaflets. Mild mitral valve regurgitation is seen.  Tricuspid Valve: The tricuspid valve is normal in structure. Trivial tricuspid regurgitation is visualized.  Aortic Valve: The aortic valve is trileaflet. Sclerotic aortic valve with normal opening. Peak transaortic gradient equals 9.5 mmHg, mean transaortic gradient equals 4.2 mmHg, the calculated aortic valve area equals 2.54 cm² by the continuity equation consistent with normally opening aortic valve.  Pulmonic Valve: Structurally normal pulmonic valve, with normal leaflet excursion. The pulmonic valve is normal. Mild pulmonic valve regurgitation.  Aorta: There is dilatation of the aortic root.      Summary:   1. Left ventricular ejection fraction,by visual estimation, is 50 to 55%.   2. Normal global left ventricular systolic function.   3. Mildly increased LV wall thickness.   4. Spectral Doppler shows impaired relaxation pattern of left ventricular myocardial filling (Grade I diastolic dysfunction).   5. There is mild concentric left ventricular hypertrophy.   6. Mildly enlarged right ventricle.   7. Mildly enlarged left atrium.   8. Mildly enlarged right atrium.   9. Mild mitral valve regurgitation.  10. Thickening of the anterior and posterior mitral valve leaflets.  11. Sclerotic aortic valve with normal opening.  12. Mild pulmonic valve regurgitation.  13. Structurally normal pulmonic valve, with normal leaflet excursion.  14. Dilatation of the aortic root.  15. LA volume Index is 25.7 ml/m² ml/m2.    [id number] Kevin Wilkinson MD,[facility] , Electronically signed on 10/3/2021 at 12:22:32 PM    < end of copied text >    < from: CT Angio Neck w/ IV Cont (10.02.21 @ 13:40) >    EXAM:  CT PERFUSION W MAPS IC    EXAM:  CT ANGIO NECK (W)AW IC    EXAM:  CT ANGIO BRAIN (W)AW IC      PROCEDURE DATE:  10/02/2021        INTERPRETATION:  CLINICAL INDICATIONS:Stroke Code dizziness with room spinning x2 hours.    TECHNIQUE: CTA brain and neck. 90 cc's of Omnipaque 350 Intravenous contrast was administered. 10 cc's discarded. 2-D MIP and 3-D volume rendering images. CT perfusion: After the administration of  50 cc's of Omnipaque 300 serial thin sections were obtained through the brain the purposes of evaluating CT perfusion. Raw data was sent to the ischemia rapid view software for  postprocessing.    COMPARISON: None    FINDINGS:      CTA BRAIN:  The [place] and vertebrobasilar system are normal without evidence of stenosis, occlusion or saccular aneurysm dilation. No evidence for arterial venous malformation. The vertebral arteries are codominant.      CTA NECK:  A left-sided aortic arch is demonstrated. There is normal relationship to the great vessels. The common carotid arteries, internal carotid arteries and vertebral arteries are normal in caliber. No evidence of stenosis, occlusion or saccular aneurysm dilation. The vertebral arteries are codominant.    CT PERFUSION:    CBF<30% volume: 0 ml  Tmax>6.0 s volume: 0 ml  Mismatch volume: 0 ml  Mismatch ratio: none      IMPRESSION:    CTA brain: No hemodynamically significant stenosis    CTA carotid/vertebral artery circulation: No hemodynamically significant stenosis    CT Perfusion: Normal    --- End of Report ---      NEGRO SEGOVIA MD; Attending Radiologist  This document has been electronically signed. Oct  2 2021  2:12PM    < end of copied text >      
70 yo male with no past medical history comes to ED after episode of dizziness that occurred while sitting at the table after having breakfast. Patient had 1 other mild episode Wed that passed on its own. Today he had this sudden feeling of not feeling right and reports that things were moving side to side. He denies spinning. He had some associated nausea. The episode lasted about 2 hours. He denies vomiting. He denies any change in eating habits or routine. States he had one drink of scotch the night before, which he does rarely. No double vision, no headaches, no numbness/tingling, no weakness. No lightheadedness. No LOC. No falls. No recent illnesses.     No family history of any neurological disease.  He takes no medications.

## 2021-10-03 NOTE — PROGRESS NOTE ADULT - PROBLEM SELECTOR PLAN 4
Encourage to ambulate    Full Code    Patient is not vaccinated for COVID-19  Will update daughter Joy Encourage to ambulate    Full Code    Patient is not vaccinated for COVID-19  Updated daughter Joy who is present at bedside

## 2021-10-03 NOTE — PROGRESS NOTE ADULT - PROBLEM SELECTOR PLAN 2
- TWI noted on initial EKG with No EKG to compare  - Follow up TTE  - Cardio consulted, Dr. Wilkinson aware and will eval  - Trend troponin - TWI noted on initial EKG with No EKG to compare  - Follow up TTE  - Cardio consulted, Dr. Wilkinson aware and will eval  - Due to risk factors and dizziness, will pursue stress test in am  - Trend troponin

## 2021-10-03 NOTE — DISCHARGE NOTE PROVIDER - NSDCCPCAREPLAN_GEN_ALL_CORE_FT
PRINCIPAL DISCHARGE DIAGNOSIS  Diagnosis: Dizziness  Assessment and Plan of Treatment: You were admitted to the hospital for evaluation of your dizziness. You had a CT of your head and neck done in the ER which were unremarkable.   You were maintained on cardiac monitoring. Neurology and Cardiology evaluated you while hospitalized. You were found to be pre-diabetic (HbA1C 6.2%) and have elevated cholesterol ().   Please take Aspirin 81mg daily and Lipitor 20mg daily. Take meclizine as needed for dizziness.   Follow up with your PCP. Follow up with Dr. Wilkinson for outpatient stress test.      SECONDARY DISCHARGE DIAGNOSES  Diagnosis: Abnormal EKG  Assessment and Plan of Treatment:

## 2021-10-03 NOTE — CONSULT NOTE ADULT - ASSESSMENT
Dizziness, improving.  Abnormal EKG, unchanged over 2 days, neg enzymes and symptoms.    Suggest elective nuclear stress testing.  d/w patient and daughter and dr irizarry
70 yo male with no past medical history comes to ED after episode of dizziness that occurred while sitting at the table after having breakfast. Patient had 1 other mild episode Wed that passed on its own. Today he had this sudden feeling of not feeling right and reports that things were moving side to side. He denies spinning. He had some associated nausea. The episode lasted about 2 hours. He denies vomiting. He denies any change in eating habits or routine. States he had one drink of scotch the night before, which he does rarely. No double vision, no headaches, no numbness/tingling, no weakness. No lightheadedness. No LOC. No falls. No recent illnesses.     No family history of any neurological disease.  He takes no medications.    Initial CT head with perfusion, and CTA head and neck were unremarkable.  Patient does have hyperlipidemia. Goal LDL <100  Evidence of prediabetes with A1c 6.1    The cause of the patient's symptoms is unclear, however, given the duration of symptoms a TIA cannot be completely ruled out. Recommend starting Aspirin 81mg daily for possible TIA. Would have patient follow up with Ophthalmology for evaluation for possible visual obscuration (side to side movements as opposed to spinning sensation which is generally more central). Would repeat CT head without contrast at 24 hours for stroke work up completion.    Remainder of care per primary team.

## 2021-10-03 NOTE — DISCHARGE NOTE PROVIDER - NSDCMRMEDTOKEN_GEN_ALL_CORE_FT
aspirin 81 mg oral tablet, chewable: 1 tab(s) orally once a day  aspirin 81 mg oral tablet, chewable: 1 tab(s) orally once  atorvastatin 20 mg oral tablet: 1 tab(s) orally once a day (at bedtime)  meclizine 25 mg oral tablet: 1 tab(s) orally 2 times a day  meclizine 25 mg oral tablet: 1 tab(s) orally 2 times a day, As Needed -Dizziness

## 2021-10-04 ENCOUNTER — TRANSCRIPTION ENCOUNTER (OUTPATIENT)
Age: 71
End: 2021-10-04

## 2021-10-04 VITALS
HEART RATE: 64 BPM | OXYGEN SATURATION: 99 % | SYSTOLIC BLOOD PRESSURE: 110 MMHG | RESPIRATION RATE: 16 BRPM | TEMPERATURE: 98 F | DIASTOLIC BLOOD PRESSURE: 70 MMHG

## 2021-10-04 PROCEDURE — 93018 CV STRESS TEST I&R ONLY: CPT

## 2021-10-04 PROCEDURE — 71045 X-RAY EXAM CHEST 1 VIEW: CPT | Mod: 26

## 2021-10-04 PROCEDURE — 99239 HOSP IP/OBS DSCHRG MGMT >30: CPT

## 2021-10-04 PROCEDURE — 93016 CV STRESS TEST SUPVJ ONLY: CPT

## 2021-10-04 PROCEDURE — 78452 HT MUSCLE IMAGE SPECT MULT: CPT | Mod: 26

## 2021-10-04 RX ADMIN — ONDANSETRON 4 MILLIGRAM(S): 8 TABLET, FILM COATED ORAL at 13:54

## 2021-10-04 RX ADMIN — Medication 25 MILLIGRAM(S): at 05:10

## 2021-10-04 RX ADMIN — REGADENOSON 0.4 MILLIGRAM(S): 0.08 INJECTION, SOLUTION INTRAVENOUS at 11:00

## 2021-10-04 RX ADMIN — Medication 81 MILLIGRAM(S): at 12:59

## 2021-10-04 NOTE — PROGRESS NOTE ADULT - PROBLEM SELECTOR PLAN 4
Encourage to ambulate  Full Code  Patient is not vaccinated for COVID-19  Provider offered to update patient's family/HCP, pt declines at this time    Dispo pending stress test/cardio recc

## 2021-10-04 NOTE — DISCHARGE NOTE NURSING/CASE MANAGEMENT/SOCIAL WORK - PATIENT PORTAL LINK FT
You can access the FollowMyHealth Patient Portal offered by [facility] by registering at the following website: [email or web address]. By joining [employer]’s FollowMyHealth portal, you will also be able to view your health information using other applications (apps) compatible with our system.

## 2021-10-04 NOTE — PROGRESS NOTE ADULT - PROBLEM SELECTOR PLAN 1
-Possibly due to Vertigo vs Dehydration  -CT head: No acute ICH or infarct  -CTA Brain/Neck: Neg for significant stenosis  -Continue telemetry monitoring to assess for arrythmia   -Neurology consulted. Repeat CTH without acute abnormality   -ASA 81mg daily and lipitor 20mg qhs ()  -Meclizine 25mg po BID x 2 days.  -Continue Meclizine TID PRN  -Zofran 4mg IVP Q6hrs PRN for nausea/vomiting
- Possibly due to Vertigo vs Dehydration  - CT head: No acute ICH or infarct.  - CTA Brain/Neck: Neg for significant stenosis  - Continue telemetry monitoring to assess for arrythmia   - Neurology consulted. Recommends repeat CT head this afternoon  - Starting ASA 81mg daily and lipitor 20mg qhs ()  - Meclizine 25mg po BID x 2 days.  - Continue Meclizine TID PRN  - Zofran 4mg IVP Q6hrs PRN for nausea/vomiting.

## 2021-10-04 NOTE — PROGRESS NOTE ADULT - PROBLEM SELECTOR PLAN 3
-Likely CKD  -Avoid nephrotoxins, encourage PO hydration
- No improvement with fluid  - likely Chronic Kidney Disease  - avoid nephrotoxins

## 2021-10-04 NOTE — PROGRESS NOTE ADULT - ASSESSMENT
72 yo male admitted to [facility] after episodes of dizziness R/O arrythmia
70 yo male admitted to [facility] after episodes of dizziness R/O arrythmia

## 2021-10-04 NOTE — PROGRESS NOTE ADULT - PROBLEM SELECTOR PLAN 2
-TWI noted on initial EKG with No EKG to compare  -Cardio following, anticipate stress test today   -Troponin trended neg

## 2021-10-04 NOTE — PROGRESS NOTE ADULT - SUBJECTIVE AND OBJECTIVE BOX
Patient is a 71y old  Male who presents with a chief complaint of Dizziness (03 Oct 2021 10:20)    Patient seen and examined at bedside. No acute overnight events. Reports improvement of dizziness. Denies chest pain, shortness of breath.     ALLERGIES:  No Known Allergies    MEDICATIONS  (STANDING):  aspirin  chewable 81 milliGRAM(s) Oral once  atorvastatin 20 milliGRAM(s) Oral at bedtime  meclizine 25 milliGRAM(s) Oral two times a day  sodium chloride 0.9%. 1000 milliLiter(s) (100 mL/Hr) IV Continuous <Continuous>    MEDICATIONS  (PRN):  acetaminophen   Tablet .. 650 milliGRAM(s) Oral every 6 hours PRN Temp greater or equal to 38C (100.4F), Mild Pain (1 - 3)  meclizine 25 milliGRAM(s) Oral three times a day PRN Dizziness  ondansetron Injectable 4 milliGRAM(s) IV Push every 6 hours PRN Nausea and/or Vomiting    Vital Signs Last 24 Hrs  T(F): 98 (03 Oct 2021 11:45), Max: 98.3 (02 Oct 2021 20:51)  HR: 75 (03 Oct 2021 11:45) (61 - 79)  BP: 121/85 (03 Oct 2021 11:45) (113/70 - 148/92)  RR: 16 (03 Oct 2021 11:45) (10 - 17)  SpO2: 93% (03 Oct 2021 11:45) (93% - 100%)  I&O's Summary    02 Oct 2021 07:01  -  03 Oct 2021 07:00  --------------------------------------------------------  IN: 200 mL / OUT: 0 mL / NET: 200 mL    BMI (kg/m2): 31.3 (10-02-21 @ 16:52)    PHYSICAL EXAM:  General: NAD, A/O x 3  ENT: MMM, no tonsilar exudate  Neck: Supple, No JVD  Lungs: Clear to auscultation bilaterally, no wheezes. Good air entry bilaterally   Cardio: RRR, S1/S2, No murmurs  Abdomen: Soft, Nontender, Nondistended; Bowel sounds present  Extremities: No calf tenderness, No pitting edema    LABS:                        14.9   7.51  )-----------( 216      ( 03 Oct 2021 05:00 )             43.2       10-03    144  |  108  |  18  ----------------------------<  96  4.3   |  29  |  1.47    Ca    9.1      03 Oct 2021 05:00  Phos  2.9     10-03  Mg     1.8     10-03    TPro  6.4  /  Alb  3.4  /  TBili  0.8  /  DBili  x   /  AST  24  /  ALT  31  /  AlkPhos  91  10-03     eGFR if Non African American: 48 mL/min/1.73M2 (10-03-21 @ 05:00)  eGFR if African American: 56 mL/min/1.73M2 (10-03-21 @ 05:00)    PT/INR - ( 02 Oct 2021 13:10 )   PT: 11.9 sec;   INR: 0.98 ratio       PTT - ( 02 Oct 2021 13:10 )  PTT:33.3 sec     CARDIAC MARKERS ( 03 Oct 2021 05:00 )  <.017 ng/mL / x     / x     / x     / x      CARDIAC MARKERS ( 02 Oct 2021 19:21 )  <.017 ng/mL / x     / x     / x     / x      CARDIAC MARKERS ( 02 Oct 2021 13:10 )  <.017 ng/mL / x     / x     / x     / x        10-03 Chol 177 mg/dL LDL -- HDL 37 mg/dL Trig 116 mg/dL  TSH 3.157   TSH with FT4 reflex --  Total T3 --    COVID-19 PCR: NotDetec (10-02-21 @ 13:05)    RADIOLOGY & ADDITIONAL TESTS:     Care Discussed with Consultants/Other Providers: d/w Dr. Frederick, Dr. Wilkinson   
Patient is a 71y old  Male who presents with a chief complaint of Dizziness (03 Oct 2021 12:49)      Patient seen and examined at bedside. doing well, no acute medical complaints. denies headache, fever, chills, cp, sob, n/v, abd pain.    ALLERGIES:  No Known Allergies    MEDICATIONS  (STANDING):  aspirin  chewable 81 milliGRAM(s) Oral daily  atorvastatin 20 milliGRAM(s) Oral at bedtime  regadenoson Injectable 0.4 milliGRAM(s) IV Push once    MEDICATIONS  (PRN):  acetaminophen   Tablet .. 650 milliGRAM(s) Oral every 6 hours PRN Temp greater or equal to 38C (100.4F), Mild Pain (1 - 3)  meclizine 25 milliGRAM(s) Oral three times a day PRN Dizziness  ondansetron Injectable 4 milliGRAM(s) IV Push every 6 hours PRN Nausea and/or Vomiting    Vital Signs Last 24 Hrs  T(F): 98 (04 Oct 2021 08:36), Max: 99.3 (03 Oct 2021 16:11)  HR: 60 (04 Oct 2021 08:36) (60 - 75)  BP: 118/84 (04 Oct 2021 08:36) (118/84 - 128/85)  RR: 16 (04 Oct 2021 08:36) (10 - 16)  SpO2: 97% (04 Oct 2021 08:36) (92% - 97%)  I&O's Summary    03 Oct 2021 07:01  -  04 Oct 2021 07:00  --------------------------------------------------------  IN: 200 mL / OUT: 0 mL / NET: 200 mL      BMI (kg/m2): 31.3 (10-02-21 @ 16:52)  PHYSICAL EXAM:  General: NAD, A/O x 3  ENT: MMM, no tonsilar exudate  Neck: Supple, No JVD  Lungs: Clear to auscultation bilaterally, no wheezes. Good air entry bilaterally   Cardio: RRR, S1/S2, No murmurs  Abdomen: Soft, Nontender, Nondistended; Bowel sounds present  Extremities: No calf tenderness, No pitting edema LE b/l    LABS:                        14.9   7.51  )-----------( 216      ( 03 Oct 2021 05:00 )             43.2       10-03    144  |  108  |  18  ----------------------------<  96  4.3   |  29  |  1.47    Ca    9.1      03 Oct 2021 05:00  Phos  2.9     10-03  Mg     1.8     10-03    TPro  6.4  /  Alb  3.4  /  TBili  0.8  /  DBili  x   /  AST  24  /  ALT  31  /  AlkPhos  91  10-03     eGFR if African American: 56 mL/min/1.73M2 (10-03-21 @ 05:00)  eGFR if Non African American: 48 mL/min/1.73M2 (10-03-21 @ 05:00)    PT/INR - ( 02 Oct 2021 13:10 )   PT: 11.9 sec;   INR: 0.98 ratio         PTT - ( 02 Oct 2021 13:10 )  PTT:33.3 sec     CARDIAC MARKERS ( 03 Oct 2021 05:00 )  <.017 ng/mL / x     / x     / x     / x      CARDIAC MARKERS ( 02 Oct 2021 19:21 )  <.017 ng/mL / x     / x     / x     / x      CARDIAC MARKERS ( 02 Oct 2021 13:10 )  <.017 ng/mL / x     / x     / x     / x          10-03 Chol 177 mg/dL LDL -- HDL 37 mg/dL Trig 116 mg/dL  TSH 3.157   TSH with FT4 reflex --  Total T3 --    COVID-19 PCR: NotDetec (10-02-21 @ 13:05)    RADIOLOGY & ADDITIONAL TESTS: reviewed  < from: CT Head No Cont (10.03.21 @ 14:43) >  FINDINGS:   CT scan dated 10/02/2021 available for review.  The brain demonstrates no abnormal attenuation.   No acute cerebral cortical infarct is seen.  No intracranial hemorrhage is found.  No mass effect is found in the brain.    The ventricles, sulci and basal cisterns appear unremarkable.    The orbits are unremarkable.  The paranasal sinuses are significant for mild mucosal thickening in the sphenoid and ethmoid sinuses.  The nasal cavity appears intact.  The nasopharynx is symmetric.  The central skull base, petrous temporal bones and calvarium remain intact.      IMPRESSION:   No acute abnormality. Mild mucosal thickening in the BILATERAL ethmoid and sphenoid sinuses.    --- End of Report ---    < end of copied text >      Care Discussed with Consultants/Other Providers: yes

## 2021-10-26 PROCEDURE — 70498 CT ANGIOGRAPHY NECK: CPT | Mod: MA

## 2021-10-26 PROCEDURE — 83036 HEMOGLOBIN GLYCOSYLATED A1C: CPT

## 2021-10-26 PROCEDURE — 85610 PROTHROMBIN TIME: CPT

## 2021-10-26 PROCEDURE — 86769 SARS-COV-2 COVID-19 ANTIBODY: CPT

## 2021-10-26 PROCEDURE — 80053 COMPREHEN METABOLIC PANEL: CPT

## 2021-10-26 PROCEDURE — 99285 EMERGENCY DEPT VISIT HI MDM: CPT | Mod: 25

## 2021-10-26 PROCEDURE — 80061 LIPID PANEL: CPT

## 2021-10-26 PROCEDURE — 85027 COMPLETE CBC AUTOMATED: CPT

## 2021-10-26 PROCEDURE — 85730 THROMBOPLASTIN TIME PARTIAL: CPT

## 2021-10-26 PROCEDURE — 84443 ASSAY THYROID STIM HORMONE: CPT

## 2021-10-26 PROCEDURE — 84484 ASSAY OF TROPONIN QUANT: CPT

## 2021-10-26 PROCEDURE — 86803 HEPATITIS C AB TEST: CPT

## 2021-10-26 PROCEDURE — 82607 VITAMIN B-12: CPT

## 2021-10-26 PROCEDURE — 78452 HT MUSCLE IMAGE SPECT MULT: CPT

## 2021-10-26 PROCEDURE — 71045 X-RAY EXAM CHEST 1 VIEW: CPT

## 2021-10-26 PROCEDURE — 0042T: CPT

## 2021-10-26 PROCEDURE — 70450 CT HEAD/BRAIN W/O DYE: CPT | Mod: MA

## 2021-10-26 PROCEDURE — 93005 ELECTROCARDIOGRAM TRACING: CPT

## 2021-10-26 PROCEDURE — 93017 CV STRESS TEST TRACING ONLY: CPT

## 2021-10-26 PROCEDURE — 84100 ASSAY OF PHOSPHORUS: CPT

## 2021-10-26 PROCEDURE — 36415 COLL VENOUS BLD VENIPUNCTURE: CPT

## 2021-10-26 PROCEDURE — 85025 COMPLETE CBC W/AUTO DIFF WBC: CPT

## 2021-10-26 PROCEDURE — 70496 CT ANGIOGRAPHY HEAD: CPT | Mod: MA

## 2021-10-26 PROCEDURE — 87635 SARS-COV-2 COVID-19 AMP PRB: CPT

## 2021-10-26 PROCEDURE — A9500: CPT

## 2021-10-26 PROCEDURE — 93306 TTE W/DOPPLER COMPLETE: CPT

## 2021-10-26 PROCEDURE — 83735 ASSAY OF MAGNESIUM: CPT
